# Patient Record
Sex: FEMALE | Race: WHITE | NOT HISPANIC OR LATINO
[De-identification: names, ages, dates, MRNs, and addresses within clinical notes are randomized per-mention and may not be internally consistent; named-entity substitution may affect disease eponyms.]

---

## 2017-01-03 ENCOUNTER — APPOINTMENT (OUTPATIENT)
Dept: UROLOGY | Facility: CLINIC | Age: 57
End: 2017-01-03

## 2017-01-03 VITALS
WEIGHT: 165 LBS | DIASTOLIC BLOOD PRESSURE: 83 MMHG | TEMPERATURE: 98.6 F | BODY MASS INDEX: 25.9 KG/M2 | HEIGHT: 67 IN | HEART RATE: 83 BPM | SYSTOLIC BLOOD PRESSURE: 145 MMHG

## 2017-01-03 DIAGNOSIS — N20.0 CALCULUS OF KIDNEY: ICD-10-CM

## 2017-01-03 DIAGNOSIS — R30.0 DYSURIA: ICD-10-CM

## 2017-01-03 DIAGNOSIS — Z87.442 PERSONAL HISTORY OF URINARY CALCULI: ICD-10-CM

## 2017-01-03 RX ORDER — PHENAZOPYRIDINE 200 MG/1
200 TABLET, FILM COATED ORAL 3 TIMES DAILY
Qty: 60 | Refills: 2 | Status: ACTIVE | COMMUNITY
Start: 2017-01-03 | End: 1900-01-01

## 2017-01-03 RX ORDER — IBUPROFEN 800 MG/1
800 TABLET, FILM COATED ORAL 3 TIMES DAILY
Qty: 90 | Refills: 3 | Status: ACTIVE | COMMUNITY
Start: 2017-01-03 | End: 1900-01-01

## 2017-01-03 RX ORDER — OXYBUTYNIN CHLORIDE 10 MG/1
10 TABLET, EXTENDED RELEASE ORAL
Qty: 30 | Refills: 3 | Status: ACTIVE | COMMUNITY
Start: 2017-01-03 | End: 1900-01-01

## 2017-01-04 LAB
APPEARANCE: ABNORMAL
BACTERIA: ABNORMAL
BILIRUBIN URINE: NEGATIVE
BLOOD URINE: ABNORMAL
COLOR: ABNORMAL
GLUCOSE QUALITATIVE U: NORMAL
HYALINE CASTS: 0 /LPF
KETONES URINE: NEGATIVE
LEUKOCYTE ESTERASE URINE: ABNORMAL
MICROSCOPIC-UA: NORMAL
NITRITE URINE: NEGATIVE
PH URINE: 7
PROTEIN URINE: 100
RED BLOOD CELLS URINE: >720 /HPF
SPECIFIC GRAVITY URINE: 1.01
SQUAMOUS EPITHELIAL CELLS: 3 /HPF
UROBILINOGEN URINE: NORMAL
WHITE BLOOD CELLS URINE: 10 /HPF

## 2017-01-05 LAB — CORE LAB FLUID CYTOLOGY: NORMAL

## 2017-01-06 LAB — BACTERIA UR CULT: ABNORMAL

## 2017-01-09 RX ORDER — CEFUROXIME AXETIL 500 MG/1
500 TABLET ORAL
Qty: 10 | Refills: 0 | Status: COMPLETED | COMMUNITY
Start: 2017-01-03 | End: 2017-01-09

## 2017-01-13 ENCOUNTER — OUTPATIENT (OUTPATIENT)
Dept: OUTPATIENT SERVICES | Facility: HOSPITAL | Age: 57
LOS: 1 days | End: 2017-01-13
Payer: COMMERCIAL

## 2017-01-13 VITALS
RESPIRATION RATE: 16 BRPM | WEIGHT: 166.45 LBS | DIASTOLIC BLOOD PRESSURE: 60 MMHG | HEART RATE: 90 BPM | TEMPERATURE: 100 F | SYSTOLIC BLOOD PRESSURE: 110 MMHG | HEIGHT: 67 IN

## 2017-01-13 DIAGNOSIS — N20.0 CALCULUS OF KIDNEY: ICD-10-CM

## 2017-01-13 DIAGNOSIS — Z90.721 ACQUIRED ABSENCE OF OVARIES, UNILATERAL: Chronic | ICD-10-CM

## 2017-01-13 DIAGNOSIS — Z96.0 PRESENCE OF UROGENITAL IMPLANTS: Chronic | ICD-10-CM

## 2017-01-13 DIAGNOSIS — Z01.818 ENCOUNTER FOR OTHER PREPROCEDURAL EXAMINATION: ICD-10-CM

## 2017-01-13 DIAGNOSIS — Z90.49 ACQUIRED ABSENCE OF OTHER SPECIFIED PARTS OF DIGESTIVE TRACT: Chronic | ICD-10-CM

## 2017-01-13 LAB
ANION GAP SERPL CALC-SCNC: 14 MMOL/L — SIGNIFICANT CHANGE UP (ref 5–17)
APPEARANCE UR: ABNORMAL
BACTERIA # UR AUTO: ABNORMAL
BILIRUB UR-MCNC: NEGATIVE — SIGNIFICANT CHANGE UP
BUN SERPL-MCNC: 27 MG/DL — HIGH (ref 8–20)
CALCIUM SERPL-MCNC: 9.5 MG/DL — SIGNIFICANT CHANGE UP (ref 8.6–10.2)
CHLORIDE SERPL-SCNC: 100 MMOL/L — SIGNIFICANT CHANGE UP (ref 98–107)
CO2 SERPL-SCNC: 28 MMOL/L — SIGNIFICANT CHANGE UP (ref 22–29)
COLOR SPEC: YELLOW — SIGNIFICANT CHANGE UP
CREAT SERPL-MCNC: 0.84 MG/DL — SIGNIFICANT CHANGE UP (ref 0.5–1.3)
DIFF PNL FLD: ABNORMAL
GLUCOSE SERPL-MCNC: 100 MG/DL — SIGNIFICANT CHANGE UP (ref 70–115)
GLUCOSE UR QL: NEGATIVE MG/DL — SIGNIFICANT CHANGE UP
HCT VFR BLD CALC: 38.2 % — SIGNIFICANT CHANGE UP (ref 37–47)
HGB BLD-MCNC: 13.2 G/DL — SIGNIFICANT CHANGE UP (ref 12–16)
KETONES UR-MCNC: NEGATIVE — SIGNIFICANT CHANGE UP
LEUKOCYTE ESTERASE UR-ACNC: ABNORMAL
MCHC RBC-ENTMCNC: 31.5 PG — HIGH (ref 27–31)
MCHC RBC-ENTMCNC: 34.6 G/DL — SIGNIFICANT CHANGE UP (ref 32–36)
MCV RBC AUTO: 91.2 FL — SIGNIFICANT CHANGE UP (ref 81–99)
NITRITE UR-MCNC: NEGATIVE — SIGNIFICANT CHANGE UP
PH UR: 8 — SIGNIFICANT CHANGE UP (ref 4.8–8)
PLATELET # BLD AUTO: 240 K/UL — SIGNIFICANT CHANGE UP (ref 150–400)
POTASSIUM SERPL-MCNC: 4.2 MMOL/L — SIGNIFICANT CHANGE UP (ref 3.5–5.3)
POTASSIUM SERPL-SCNC: 4.2 MMOL/L — SIGNIFICANT CHANGE UP (ref 3.5–5.3)
PROT UR-MCNC: 100 MG/DL
RBC # BLD: 4.19 M/UL — LOW (ref 4.4–5.2)
RBC # FLD: 13 % — SIGNIFICANT CHANGE UP (ref 11–15.6)
RBC CASTS # UR COMP ASSIST: ABNORMAL /HPF (ref 0–4)
SODIUM SERPL-SCNC: 142 MMOL/L — SIGNIFICANT CHANGE UP (ref 135–145)
SP GR SPEC: 1.01 — SIGNIFICANT CHANGE UP (ref 1.01–1.02)
UROBILINOGEN FLD QL: NEGATIVE MG/DL — SIGNIFICANT CHANGE UP
WBC # BLD: 6.93 K/UL — SIGNIFICANT CHANGE UP (ref 4.8–10.8)
WBC # FLD AUTO: 6.93 K/UL — SIGNIFICANT CHANGE UP (ref 4.8–10.8)
WBC UR QL: SIGNIFICANT CHANGE UP

## 2017-01-13 PROCEDURE — 85027 COMPLETE CBC AUTOMATED: CPT

## 2017-01-13 PROCEDURE — 81001 URINALYSIS AUTO W/SCOPE: CPT

## 2017-01-13 PROCEDURE — G0463: CPT

## 2017-01-13 PROCEDURE — 93010 ELECTROCARDIOGRAM REPORT: CPT

## 2017-01-13 PROCEDURE — 80048 BASIC METABOLIC PNL TOTAL CA: CPT

## 2017-01-13 PROCEDURE — 93005 ELECTROCARDIOGRAM TRACING: CPT

## 2017-01-13 PROCEDURE — 87186 SC STD MICRODIL/AGAR DIL: CPT

## 2017-01-13 PROCEDURE — 87086 URINE CULTURE/COLONY COUNT: CPT

## 2017-01-13 RX ORDER — SODIUM CHLORIDE 9 MG/ML
3 INJECTION INTRAMUSCULAR; INTRAVENOUS; SUBCUTANEOUS ONCE
Qty: 0 | Refills: 0 | Status: DISCONTINUED | OUTPATIENT
Start: 2017-01-20 | End: 2017-02-04

## 2017-01-13 RX ORDER — VANCOMYCIN HCL 1 G
1000 VIAL (EA) INTRAVENOUS ONCE
Qty: 0 | Refills: 0 | Status: DISCONTINUED | OUTPATIENT
Start: 2017-01-20 | End: 2017-02-04

## 2017-01-13 NOTE — ASU PATIENT PROFILE, ADULT - LEARNING ASSESSMENT (PATIENT) ADDITIONAL COMMENTS
Instructed pt on pre-op instructions, tips for safer surgery, pain management scale and verbalized understanding of all. Ebola Screening: negative.

## 2017-01-13 NOTE — H&P PST ADULT - LAB RESULTS AND INTERPRETATION
1/17/2017 Spoke with Dr. Brizuela's surgical coordinator regarding abnormal urine culture results. (THERESE ALFREDO)

## 2017-01-13 NOTE — H&P PST ADULT - NSANTHOSAYNRD_GEN_A_CORE
No. AMY screening performed.  STOP BANG Legend: 0-2 = LOW Risk; 3-4 = INTERMEDIATE Risk; 5-8 = HIGH Risk

## 2017-01-13 NOTE — H&P PST ADULT - HISTORY OF PRESENT ILLNESS
This is a 56 y.o female who presents to [facility] today.  The pt has a left ureteral stent due to kidney stones that were diagnosed in December.  She is scheduled for additional surgery in the near future.

## 2017-01-13 NOTE — H&P PST ADULT - FAMILY HISTORY
Mother  Still living? Yes, Estimated age: 71-80  Family history of breast cancer, Age at diagnosis: 51-60

## 2017-01-15 LAB
-  AMPICILLIN: SIGNIFICANT CHANGE UP
-  NITROFURANTOIN: SIGNIFICANT CHANGE UP
-  TETRACYCLINE: SIGNIFICANT CHANGE UP
-  VANCOMYCIN: SIGNIFICANT CHANGE UP
CULTURE RESULTS: SIGNIFICANT CHANGE UP
METHOD TYPE: SIGNIFICANT CHANGE UP
ORGANISM # SPEC MICROSCOPIC CNT: SIGNIFICANT CHANGE UP
ORGANISM # SPEC MICROSCOPIC CNT: SIGNIFICANT CHANGE UP
SPECIMEN SOURCE: SIGNIFICANT CHANGE UP

## 2017-01-17 DIAGNOSIS — Z87.440 PERSONAL HISTORY OF URINARY (TRACT) INFECTIONS: ICD-10-CM

## 2017-01-17 DIAGNOSIS — Z01.818 ENCOUNTER FOR OTHER PREPROCEDURAL EXAMINATION: ICD-10-CM

## 2017-01-17 DIAGNOSIS — N20.0 CALCULUS OF KIDNEY: ICD-10-CM

## 2017-01-17 RX ORDER — NITROFURANTOIN (MONOHYDRATE/MACROCRYSTALS) 25; 75 MG/1; MG/1
100 CAPSULE ORAL
Qty: 20 | Refills: 0 | Status: ACTIVE | COMMUNITY
Start: 2017-01-17 | End: 1900-01-01

## 2017-01-19 ENCOUNTER — RESULT REVIEW (OUTPATIENT)
Age: 57
End: 2017-01-19

## 2017-01-20 ENCOUNTER — APPOINTMENT (OUTPATIENT)
Dept: UROLOGY | Facility: HOSPITAL | Age: 57
End: 2017-01-20

## 2017-01-20 ENCOUNTER — OUTPATIENT (OUTPATIENT)
Dept: OUTPATIENT SERVICES | Facility: HOSPITAL | Age: 57
LOS: 1 days | End: 2017-01-20
Payer: COMMERCIAL

## 2017-01-20 VITALS
OXYGEN SATURATION: 98 % | WEIGHT: 164.91 LBS | HEIGHT: 67 IN | RESPIRATION RATE: 14 BRPM | TEMPERATURE: 98 F | HEART RATE: 88 BPM | DIASTOLIC BLOOD PRESSURE: 65 MMHG | SYSTOLIC BLOOD PRESSURE: 116 MMHG

## 2017-01-20 VITALS
OXYGEN SATURATION: 98 % | RESPIRATION RATE: 12 BRPM | DIASTOLIC BLOOD PRESSURE: 72 MMHG | TEMPERATURE: 98 F | SYSTOLIC BLOOD PRESSURE: 108 MMHG | HEART RATE: 70 BPM

## 2017-01-20 DIAGNOSIS — Z90.49 ACQUIRED ABSENCE OF OTHER SPECIFIED PARTS OF DIGESTIVE TRACT: Chronic | ICD-10-CM

## 2017-01-20 DIAGNOSIS — Z90.721 ACQUIRED ABSENCE OF OVARIES, UNILATERAL: Chronic | ICD-10-CM

## 2017-01-20 DIAGNOSIS — Z01.818 ENCOUNTER FOR OTHER PREPROCEDURAL EXAMINATION: ICD-10-CM

## 2017-01-20 DIAGNOSIS — N20.0 CALCULUS OF KIDNEY: ICD-10-CM

## 2017-01-20 DIAGNOSIS — Z96.0 PRESENCE OF UROGENITAL IMPLANTS: Chronic | ICD-10-CM

## 2017-01-20 PROCEDURE — 52356 CYSTO/URETERO W/LITHOTRIPSY: CPT | Mod: 58,LT

## 2017-01-20 PROCEDURE — 52317 REMOVE BLADDER STONE: CPT

## 2017-01-20 PROCEDURE — 52332 CYSTOSCOPY AND TREATMENT: CPT

## 2017-01-20 PROCEDURE — 88300 SURGICAL PATH GROSS: CPT

## 2017-01-20 PROCEDURE — 88300 SURGICAL PATH GROSS: CPT | Mod: 26

## 2017-01-20 PROCEDURE — 76000 FLUOROSCOPY <1 HR PHYS/QHP: CPT

## 2017-01-20 PROCEDURE — C2617: CPT

## 2017-01-20 PROCEDURE — C1889: CPT

## 2017-01-20 RX ORDER — ONDANSETRON 8 MG/1
4 TABLET, FILM COATED ORAL ONCE
Qty: 0 | Refills: 0 | Status: DISCONTINUED | OUTPATIENT
Start: 2017-01-20 | End: 2017-01-20

## 2017-01-20 RX ORDER — FENTANYL CITRATE 50 UG/ML
50 INJECTION INTRAVENOUS
Qty: 0 | Refills: 0 | Status: DISCONTINUED | OUTPATIENT
Start: 2017-01-20 | End: 2017-01-20

## 2017-01-20 RX ORDER — SODIUM CHLORIDE 9 MG/ML
1000 INJECTION, SOLUTION INTRAVENOUS
Qty: 0 | Refills: 0 | Status: DISCONTINUED | OUTPATIENT
Start: 2017-01-20 | End: 2017-01-20

## 2017-01-20 NOTE — BRIEF OPERATIVE NOTE - PROCEDURE
Left ureteroscopy with laser lithotripsy  01/20/2017  with left stent eschange  Active  [facility]

## 2017-01-25 ENCOUNTER — APPOINTMENT (OUTPATIENT)
Dept: UROLOGY | Facility: HOSPITAL | Age: 57
End: 2017-01-25

## 2017-01-26 LAB — SURGICAL PATHOLOGY FINAL REPORT - CH: SIGNIFICANT CHANGE UP

## 2019-11-04 NOTE — H&P PST ADULT - NSANTHBMIRD_ENT_A_CORE
Price (Do Not Change): 0.00
Detail Level: Simple
Instructions: This plan will send the code FBSE to the PM system.  DO NOT or CHANGE the price.
No

## 2020-12-15 PROBLEM — Z87.440 HISTORY OF URINARY TRACT INFECTION: Status: RESOLVED | Noted: 2017-01-17 | Resolved: 2020-12-15

## 2022-09-20 NOTE — H&P PST ADULT - NSANTHAGERD_ENT_A_CORE
"  Subjective:  Stacy Brown is a 48 year old female who presents today for follow-up regarding     Low back pain    Work comp injury [employer] 7/15/2022.  Reported noncompliance with work restriction, and union grievance pending    Pelvic Joint Dysfunction-resolved    Neurologically intact    Minor setback of muscular pain associated with her recent move from her dwelling, and she had no choice other than to do some lifting but try to stay within the restrictions.        PRIOR HISTORY from 7/19/2022:  She was seen for evaluation of low back pain and a work comp injury upon referral from the emergency department.  ED note from 7/15/2022 records the following: \"Stacy Brown is a 48 year old female who presents for evaluation of an injury that occurred at work earlier today.  She states that she bent over to  a package weighing 30-40 pounds.  She picked it up to waist level and then transferred it to a cart.  When she was putting the package down in the cart she felt and heard a pop in her low back which was very audible.  Her coworker about 8 feet away heard the pop as well.  She had immediate onset of 9 on a scale of 10 pain which was sharp in nature.  She has had some radiation of the pain down the left thigh down to the level of the mid lower leg.  Does not extend into the foot.  She has had off-and-on numbness/tingling of the medial thigh.  She reports chronic numbness of her left foot from her previous issue, but this is unchanged.  She denies any loss of bowel/bladder function.  No recent fever or chills.  No saddle anesthesia.  No other injuries.  She is here with her .  She did not take anything for symptoms prior to coming to the ED.\"  Her exam showed tender L3-L5 spinous processes as well as bilateral paravertebral muscles, limited lumbosacral range of motion, and intact neurologic function.  Peripheral pulses were normal.  She had full painless range of motion of hips and " "knees.  SLR was noted to be positive at 45 degrees.     Lumbar x-rays taken that day were normal  She was given a Dilaudid injection and discharged home with a prescription for 50 mg prednisone pills and cyclobenzaprine.  Diagnosis was lumbar sprain but disc herniation was considered as a possibility for left leg symptoms.  No MRI was done.  Restrictions were given.  Orthopedic  referral was made.  She was advised to follow her blood sugars because of the steroids.     Stacy confirms the above history.  She points to the midline of the lumbosacral spine as the site of the perceived \"pop\" that she heard as well as the site of recurrent pain..  She has intermittent tingling but no true numbness down the posterior left thigh and calf.  There is no true numbness or weakness, bowel or bladder dysfunction, or saddle anesthesia.        Updated 8/3/2022 HISTORY:    PT staff message as follows: \"Mary Herman seen Stacy 2x. She started on Myofascial stretches and I added in for psoas and ITB, she started the stabilization protocol also. I had her trial SI belt per her preference and felt that was good support for her upright items at work so I put an order in and dispensed for her to use at work. Her innominate was better after I corrected once and you corrected once. Ill keep working with her.\"     Stacy is not interested in being taken off of work but she says that her supervisor today gave her a \"direct order\" to exceed her restrictions by having her lift 20, 40 and 60 pounds from from the floor, in direct conflict with my work restrictions.  She was on the phone with her  at the time and they are filing a grievance with the employer.  She has spoken HR.  She was told that if she did not do it she was told today she would be fired.     Stacy feels like she is making progress and she is working with her physical therapist who saw her last week.  She does not feel like she is \"off\" today.  She is not " "currently represented by an      Updated 9/6/2022 HISTORY:    Staff message received from her PT as follows: \"4 total visits with Stacy d/t her family stomach flu illness the last 2 weeks missed a few sessions.   She is 70-80% better, her pain really has remained in the L hip posterior to greater trochanter. Finzel ilium and sacrum level, SI belt helpful as needed, working hip ROM along with myofascial protocol stretch and stability work, we reviewed lifting mechanics today She will continue for another few weeks.\"        Stacy confirms that she is making good progress.  Unfortunately, because her house was being foreclosed upon, she and her family and some coworkers had to move her belongings over the weekend and she thinks she may have lifted as much is 40 pounds but her some thinks it was not more than 20.  In any case she is little more sore than she was before the weekend but does not feel like her pelvis is off.  No other radiating symptoms or red flags noted.     INTERIM HISTORY:    Follow-up message from PT as follows: \"Has attended 2 visits since your last visit with her. We needed to do extension based exercise to centralize radicular pain after her flare. We have now been pain free for 4 days with this and will continue this for the next 4-8 weeks to maintain discogenic symptom control especially if she returns to work. She's doing myofascial protocols and I added in quadruped core work.  We did some work with lifting mechanics.  I will  see her biweekly for progression\"    In addition, her employer, [employer], sent me a letter asking for restrictions that they committed to complying with, and disputed the patient's report about her employer requiring her to exceed restrictions.  I agreed to first see Stacy early in order to address medically reasonable restrictions , and to give her employer another chance to comply with them appropriately, rather than simply change " "them without discussing this with my patient..    I did review with Stacy the letter the post office sent me as well as their response to the grievance.  Stacy disputes the accuracy of that response and I will stay out of it.    Medically she was feeling a lot better.  She is doing gluteal strengthening exercises and that causing some expected achiness in those muscles but overall she is feeling \"a lot better\".  She is willing to go back to work within the restrictions that I gave her provided that her employer stays within those restrictions and does not threaten her to exceed them.  Her only current symptoms are a little bit of achiness in the but without any referred symptoms down her legs bowel or bladder dysfunction or saddle anesthesia.  Her therapist has not had to adjust her pelvis in some time.  She \"feels so much better\".    Past medical history is reviewed and is unchanged for any new medical diagnoses in the interim.  Pertinent for chronic knee pain, status post NSTEMI and CABG March 2021, tobacco abuse disorder, type 2 diabetes.  She denies any prior low back problems     SOCIAL HX: This is a work comp injury.  She is a  for the [employer].  She was doing her normal job without restrictions at the time of the injury.  She is .  Other than her job, she has a relatively sedentary lifestyle and denies sports hobbies or activities.  See above.  Her employer is requiring her to exceed her restrictions, and violation of my work restrictions.  I took her off work on 8/3/2022 as a result.She remains off work.  The status of her union grievance with the employee remains unknown to her as there are 6 other people with grievances that are pending.     Objective:       IMAGING: Images and report reviewed.     LUMBAR SPINE TWO TO THREE VIEWS   7/15/2022                                                                   IMPRESSION: Alignment of the lumbar spine is within normal " limits. No  loss of vertebral body height. No significant degenerative endplate  changes or loss of intervertebral disc space.      EXAMINATION:    CONSTITUTIONAL:  Vital signs as above.  No acute distress.  The patient is well nourished and well groomed.  She transitions well and moves fluidly  PSYCHIATRIC:  The patient is awake, alert, oriented to person, place and time.  The patient is answering questions appropriately with clear speech.  Normal affect.  Able to follow commands  MUSCULOSKELETAL:  Gait is non-antalgic.  The patient is able to heel and toe walk without any difficulty.   Squat and rise normal.   She does have a tendency to hesitate on full squat and rise and want to use her hands and I told her that is important for her to be able to do without assistance so she can lift with proper technique..  Back ROM: Full fluid and pain.     NEUROLOGICAL:    Motor:  L3-S1 myotomes 5/5     Sensation to light touch is intact in the bilateral lower extremities.    SLR negative    Pelvis: Neutral alignment.  Spring testing the SI joints negative.  No gluteal tenderness    Assessment     Stacy Brown  is a 48 year old y.o. female who presents today for follow-up regarding     Low back pain    Work comp injury [employer] 7/15/2022.  Reported noncompliance with work restriction, and union grievance pending.  Employer is disputing this.    Pelvic Joint Dysfunction-resolved    Neurologically intact    Minor setback of muscular pain associated with her recent move from her dwelling, and she had no choice other than to do some lifting but try to stay within the restrictions-resolved    Based on PT response, possibility of discogenic source of pain as well.      Plan:  As I am not a finder of fact, I cannot weigh in as to which version of the events is accurate but I have no reason to disbelieve my patient.  In any case she is willing to try going back to work with graduated removal or restrictions.  I will let  her return to work now with a 25 pound lifting limit, raise that to 40 pounds in 2 weeks, and remove all restrictions in 4 weeks.  Follow-up with me in 6 weeks.  In order to mitigate against future recurrences of this back problem, I am also recommending a medic strengthening program which she is willing to undergo if she can get her insurance approval for it.      45 minutes of time spent doing chart review, history and exam, documentation, counseling, education, coordination of care, and other activities as described above.          Advised patient to call or return early if symptoms worsen, or having problems controlling bladder and bowel function or worsening leg weakness.     Please note: Voice recognition software was used in this dictation.  It may therefore contain typographical errors.  Jorge Luis Brooke MD   Yes

## 2023-05-20 ENCOUNTER — NON-APPOINTMENT (OUTPATIENT)
Age: 63
End: 2023-05-20

## 2023-05-26 NOTE — ASU PATIENT PROFILE, ADULT - NS PRO TALK SOMEONE YN
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

## 2024-04-19 ENCOUNTER — NON-APPOINTMENT (OUTPATIENT)
Age: 64
End: 2024-04-19

## 2024-10-23 ENCOUNTER — APPOINTMENT (OUTPATIENT)
Dept: ORTHOPEDIC SURGERY | Facility: CLINIC | Age: 64
End: 2024-10-23
Payer: COMMERCIAL

## 2024-10-23 DIAGNOSIS — M25.562 PAIN IN RIGHT KNEE: ICD-10-CM

## 2024-10-23 DIAGNOSIS — M25.561 PAIN IN RIGHT KNEE: ICD-10-CM

## 2024-10-23 PROCEDURE — 73562 X-RAY EXAM OF KNEE 3: CPT | Mod: 50

## 2024-10-23 PROCEDURE — 99203 OFFICE O/P NEW LOW 30 MIN: CPT | Mod: 25

## 2024-10-23 PROCEDURE — 99204 OFFICE O/P NEW MOD 45 MIN: CPT | Mod: 25

## 2024-11-21 ENCOUNTER — APPOINTMENT (OUTPATIENT)
Dept: ORTHOPEDIC SURGERY | Facility: CLINIC | Age: 64
End: 2024-11-21

## 2024-12-20 ENCOUNTER — TRANSCRIPTION ENCOUNTER (OUTPATIENT)
Age: 64
End: 2024-12-20

## 2025-08-08 ENCOUNTER — APPOINTMENT (OUTPATIENT)
Dept: ORTHOPEDIC SURGERY | Facility: CLINIC | Age: 65
End: 2025-08-08
Payer: COMMERCIAL

## 2025-08-08 DIAGNOSIS — M18.9 OSTEOARTHRITIS OF FIRST CARPOMETACARPAL JOINT, UNSPECIFIED: ICD-10-CM

## 2025-08-08 DIAGNOSIS — M18.11 UNILATERAL PRIMARY OSTEOARTHRITIS OF FIRST CARPOMETACARPAL JOINT, RIGHT HAND: ICD-10-CM

## 2025-08-08 DIAGNOSIS — M25.531 PAIN IN RIGHT WRIST: ICD-10-CM

## 2025-08-08 PROCEDURE — 20605 DRAIN/INJ JOINT/BURSA W/O US: CPT | Mod: RT

## 2025-08-08 PROCEDURE — 73110 X-RAY EXAM OF WRIST: CPT | Mod: RT

## 2025-08-08 PROCEDURE — 99204 OFFICE O/P NEW MOD 45 MIN: CPT | Mod: 25
